# Patient Record
Sex: FEMALE | Race: ASIAN
[De-identification: names, ages, dates, MRNs, and addresses within clinical notes are randomized per-mention and may not be internally consistent; named-entity substitution may affect disease eponyms.]

---

## 2018-05-14 ENCOUNTER — HOSPITAL ENCOUNTER (EMERGENCY)
Dept: HOSPITAL 54 - ER | Age: 48
Discharge: HOME | End: 2018-05-14
Payer: COMMERCIAL

## 2018-05-14 VITALS — SYSTOLIC BLOOD PRESSURE: 101 MMHG | DIASTOLIC BLOOD PRESSURE: 56 MMHG

## 2018-05-14 VITALS — WEIGHT: 88 LBS | BODY MASS INDEX: 16.2 KG/M2 | HEIGHT: 62 IN

## 2018-05-14 DIAGNOSIS — R10.84: Primary | ICD-10-CM

## 2018-05-14 DIAGNOSIS — D72.829: ICD-10-CM

## 2018-05-14 DIAGNOSIS — Z85.43: ICD-10-CM

## 2018-05-14 DIAGNOSIS — Z88.0: ICD-10-CM

## 2018-05-14 LAB
ALBUMIN SERPL BCP-MCNC: 3.5 G/DL (ref 3.4–5)
ALP SERPL-CCNC: 98 U/L (ref 46–116)
ALT SERPL W P-5'-P-CCNC: 22 U/L (ref 12–78)
AST SERPL W P-5'-P-CCNC: 13 U/L (ref 15–37)
BASOPHILS # BLD AUTO: 0 /CMM (ref 0–0.2)
BASOPHILS NFR BLD AUTO: 0.1 % (ref 0–2)
BILIRUB DIRECT SERPL-MCNC: 0.1 MG/DL (ref 0–0.2)
BILIRUB SERPL-MCNC: 1.2 MG/DL (ref 0.2–1)
BUN SERPL-MCNC: 18 MG/DL (ref 7–18)
CALCIUM SERPL-MCNC: 9 MG/DL (ref 8.5–10.1)
CHLORIDE SERPL-SCNC: 98 MMOL/L (ref 98–107)
CO2 SERPL-SCNC: 27 MMOL/L (ref 21–32)
CREAT SERPL-MCNC: 0.6 MG/DL (ref 0.6–1.3)
EOSINOPHIL NFR BLD AUTO: 0.2 % (ref 0–6)
GLUCOSE SERPL-MCNC: 102 MG/DL (ref 74–106)
HCT VFR BLD AUTO: 39 % (ref 33–45)
HGB BLD-MCNC: 13.3 G/DL (ref 11.5–14.8)
LIPASE SERPL-CCNC: 66 U/L (ref 73–393)
LYMPHOCYTES NFR BLD AUTO: 0.9 /CMM (ref 0.8–4.8)
LYMPHOCYTES NFR BLD AUTO: 4.4 % (ref 20–44)
LYMPHOCYTES NFR BLD MANUAL: 6 % (ref 16–48)
MCHC RBC AUTO-ENTMCNC: 34 G/DL (ref 31–36)
MCV RBC AUTO: 89 FL (ref 82–100)
MONOCYTES NFR BLD AUTO: 1.4 /CMM (ref 0.1–1.3)
MONOCYTES NFR BLD AUTO: 7.1 % (ref 2–12)
MONOCYTES NFR BLD MANUAL: 5 % (ref 0–11)
NEUTROPHILS # BLD AUTO: 17.4 /CMM (ref 1.8–8.9)
NEUTROPHILS NFR BLD AUTO: 88.2 % (ref 43–81)
NEUTS BAND NFR BLD MANUAL: 6 % (ref 0–5)
NEUTS SEG NFR BLD MANUAL: 83 % (ref 42–76)
PLATELET # BLD AUTO: 184 /CMM (ref 150–450)
POTASSIUM SERPL-SCNC: 3.9 MMOL/L (ref 3.5–5.1)
PROT SERPL-MCNC: 6.9 G/DL (ref 6.4–8.2)
RBC # BLD AUTO: 4.37 MIL/UL (ref 4–5.2)
RDW COEFFICIENT OF VARIATION: 12.1 (ref 11.5–15)
SODIUM SERPL-SCNC: 134 MMOL/L (ref 136–145)
WBC NRBC COR # BLD AUTO: 19.7 K/UL (ref 4.3–11)

## 2018-05-14 PROCEDURE — Z7610: HCPCS

## 2018-05-14 PROCEDURE — A4606 OXYGEN PROBE USED W OXIMETER: HCPCS

## 2018-05-14 NOTE — NUR
-------------------------------------------------------------------------------

          *** Note undone in [facility] - 05/14/18 at 0207 by ALEKSEY ***           

-------------------------------------------------------------------------------

20G LEFT AC IV STARTED. BLOOD SAMPLE OBTAINED AND SENT TO LAB

## 2018-05-14 NOTE — NUR
PT BB  FROM HOME C/O RUQ ABD PAIN 8/10 NON RADIATING SINCE YESTERDAY, 
WORSE TODAY. LBM TODAY WITH "SMALL AMOUNTS OF BRIGHT RED BLOOD IN FECES". 
+N,-V/D. SKIN WNL. VSS. PT IS AAOX4. RESP EVEN AND UNLABORED. NO S/S OF ACUTE 
DISTRESS NOTED. PT PLACED ON CARDIAC MONITOR AND POX. PT SAFETY AND COMFORT 
MEASURES IN PLACE. PT'S  BEDSIDE. AWAITING MD FOR EVAL.

## 2018-05-14 NOTE — NUR
Called to do ultrasound for liver. I arrived @ 04:07 and found out test was cancelled by dr. fernandez. No call back from radiology